# Patient Record
Sex: MALE | Race: WHITE | NOT HISPANIC OR LATINO | URBAN - METROPOLITAN AREA
[De-identification: names, ages, dates, MRNs, and addresses within clinical notes are randomized per-mention and may not be internally consistent; named-entity substitution may affect disease eponyms.]

---

## 2021-10-07 ENCOUNTER — EMERGENCY (EMERGENCY)
Facility: HOSPITAL | Age: 31
LOS: 1 days | Discharge: HOME | End: 2021-10-07
Admitting: EMERGENCY MEDICINE
Payer: COMMERCIAL

## 2021-10-07 VITALS
HEIGHT: 69 IN | OXYGEN SATURATION: 98 % | RESPIRATION RATE: 16 BRPM | HEART RATE: 92 BPM | DIASTOLIC BLOOD PRESSURE: 94 MMHG | WEIGHT: 160.06 LBS | SYSTOLIC BLOOD PRESSURE: 147 MMHG | TEMPERATURE: 98 F

## 2021-10-07 DIAGNOSIS — K21.9 GASTRO-ESOPHAGEAL REFLUX DISEASE WITHOUT ESOPHAGITIS: ICD-10-CM

## 2021-10-07 DIAGNOSIS — R07.0 PAIN IN THROAT: ICD-10-CM

## 2021-10-07 PROCEDURE — 99283 EMERGENCY DEPT VISIT LOW MDM: CPT

## 2021-10-07 NOTE — ED PROVIDER NOTE - NSFOLLOWUPINSTRUCTIONS_ED_ALL_ED_FT
1. Follow up with your GI Specialist next week if your symptoms continue  2. Return to the ED if you develop worsening symptoms such as fevers, cough, shortness of breath or vomiting.

## 2021-10-07 NOTE — ED PROVIDER NOTE - PATIENT PORTAL LINK FT
You can access the FollowMyHealth Patient Portal offered by [facility] by registering at the following website: [email or web address]. By joining [facility]’s FollowMyHealth portal, you will also be able to view your health information using other applications (apps) compatible with our system.

## 2021-10-07 NOTE — ED PROVIDER NOTE - CLINICAL SUMMARY MEDICAL DECISION MAKING FREE TEXT BOX
burning in throat similar to acid reflux in past, no acute findings in ED. (-) signs of allergic or anaphylaxis. Recommend to add Maalox with PPIs & fu GI.

## 2021-10-07 NOTE — ED PROVIDER NOTE - CONSTITUTIONAL, MLM
Well appearing, awake, alert, oriented to person, place, time/situation and in no apparent distress. Patient is speaking full sentences without difficulty. normal...

## 2021-10-07 NOTE — ED ADULT TRIAGE NOTE - CHIEF COMPLAINT QUOTE
c/o worsening acid reflux and throat pain x 3 months, went to urgent care today with c/o difficulty breathing. Pt states "I think I'm just having a panic attack due to the acid reflux" pt states that breathing difficulties have resolved

## 2021-10-07 NOTE — ED PROVIDER NOTE - OBJECTIVE STATEMENT
32 yo M h/o Acid Reflux complains of worsening burning in his throat x 1 day. Pt states he has similar symptoms in June, seen by GI specialist and was diagnosed with GERD. Currently taking PPIs every morning. Currently, patient's symptoms have subsided in the ED.    Pt denies shortness of breath, fevers, chills, body aches, exudates, cough, vomiting, diarrhea of abdominal pain. Pt also denies change in diet or medication non-compliance.

## 2021-10-07 NOTE — ED PROVIDER NOTE - ENMT, MLM
Airway patent, Nasal mucosa clear. Mouth with normal mucosa. (+) mild erythema to posterior pharynx. Throat has no vesicles, no oropharyngeal exudates and uvula is midline.

## 2022-01-22 PROBLEM — K21.9 GASTRO-ESOPHAGEAL REFLUX DISEASE WITHOUT ESOPHAGITIS: Chronic | Status: ACTIVE | Noted: 2021-10-07

## 2022-05-03 ENCOUNTER — EMERGENCY (EMERGENCY)
Facility: HOSPITAL | Age: 32
LOS: 1 days | Discharge: HOME | End: 2022-05-03
Attending: EMERGENCY MEDICINE | Admitting: EMERGENCY MEDICINE
Payer: COMMERCIAL

## 2022-05-03 VITALS
HEART RATE: 98 BPM | DIASTOLIC BLOOD PRESSURE: 86 MMHG | RESPIRATION RATE: 19 BRPM | WEIGHT: 160.06 LBS | SYSTOLIC BLOOD PRESSURE: 126 MMHG | TEMPERATURE: 98 F | OXYGEN SATURATION: 96 % | HEIGHT: 69 IN

## 2022-05-03 DIAGNOSIS — R68.84 JAW PAIN: ICD-10-CM

## 2022-05-03 DIAGNOSIS — M26.602 LEFT TEMPOROMANDIBULAR JOINT DISORDER, UNSPECIFIED: ICD-10-CM

## 2022-05-03 DIAGNOSIS — Z88.1 ALLERGY STATUS TO OTHER ANTIBIOTIC AGENTS STATUS: ICD-10-CM

## 2022-05-03 PROCEDURE — 99282 EMERGENCY DEPT VISIT SF MDM: CPT

## 2022-05-03 RX ORDER — ACETAMINOPHEN 500 MG
650 TABLET ORAL ONCE
Refills: 0 | Status: COMPLETED | OUTPATIENT
Start: 2022-05-03 | End: 2022-05-03

## 2022-05-03 RX ADMIN — Medication 650 MILLIGRAM(S): at 22:46

## 2022-05-03 NOTE — ED PROVIDER NOTE - OBJECTIVE STATEMENT
30 yo M, denies pmhx, complaining of months of L TMJ pain. States last night he felt it "pop" and felt like his jaw locked for a few seconds. States he had a lot of pain at the time at the site, on the L side which has since improved. States he took 600mg of ibuprofen which helped. Denies numbness, tingling, weakness, dental pain. Unclear if patient grinds teeth.

## 2022-05-03 NOTE — ED ADULT TRIAGE NOTE - CHIEF COMPLAINT QUOTE
Pt presents to ed reporting left sided jaw pain approx. 30 min pta. no fever/chills/no abscesses. was lying down when pain began. no recent dental procedures

## 2022-05-03 NOTE — ED PROVIDER NOTE - CLINICAL SUMMARY MEDICAL DECISION MAKING FREE TEXT BOX
Well appearing male, with signs and symptoms of TMJ. Advised adding tylenol to regimen, getting a mouth guard to sleep with for night time and contacting his dentist to see if getting a well fitting mouth guard would help. D/C.

## 2022-05-03 NOTE — ED ADULT NURSE NOTE - OBJECTIVE STATEMENT
Received pt. from triage nurse with complaints of left sided jaw pain .Pt is alert and oriented x3 with no acute distress. Pt seen and evaluated by physician. Pt is medicated as per MD ordered. Pt tolerated well. Safety precautions are maintainer Will continue to monitor.

## 2022-05-03 NOTE — ED PROVIDER NOTE - NSFOLLOWUPINSTRUCTIONS_ED_ALL_ED_FT
Temporomandibular Joint Syndrome       Temporomandibular joint syndrome (TMJ syndrome) is a condition that causes pain in the temporomandibular joints. These joints are located near your ears and allow your jaw to open and close. For people with TMJ syndrome, chewing, biting, or other movements of the jaw can be difficult or painful.    TMJ syndrome is often mild and goes away within a few weeks. However, sometimes the condition becomes a long-term (chronic) problem.      What are the causes?    This condition may be caused by:  •Grinding your teeth or clenching your jaw. Some people do this when they are under stress.      •Arthritis.      •Injury to the jaw.      •Head or neck injury.      •Teeth or dentures that are not aligned well.      In some cases, the cause of TMJ syndrome may not be known.      What are the signs or symptoms?    The most common symptom of this condition is an aching pain on the side of the head in the area of the TMJ. Other symptoms may include:  •Pain when moving your jaw, such as when chewing or biting.      •Being unable to open your jaw all the way.      •Making a clicking sound when you open your mouth.      •Headache.      •Earache.      •Neck or shoulder pain.        How is this diagnosed?    This condition may be diagnosed based on:  •Your symptoms and medical history.      •A physical exam. Your health care provider may check the range of motion of your jaw.      •Imaging tests, such as X-rays or an MRI.      You may also need to see your dentist, who will determine if your teeth and jaw are lined up correctly.      How is this treated?    TMJ syndrome often goes away on its own. If treatment is needed, the options may include:  •Eating soft foods and applying ice or heat.      •Medicines to relieve pain or inflammation.      •Medicines or massage to relax the muscles.      •A splint, bite plate, or mouthpiece to prevent teeth grinding or jaw clenching.      •Relaxation techniques or counseling to help reduce stress.      •A therapy for pain in which an electrical current is applied to the nerves through the skin (transcutaneous electrical nerve stimulation).      •Acupuncture. This is sometimes helpful to relieve pain.      •Jaw surgery. This is rarely needed.        Follow these instructions at home:     Eating and drinking     •Eat a soft diet if you are having trouble chewing.      •Avoid foods that require a lot of chewing. Do not chew gum.      General instructions     •Take over-the-counter and prescription medicines only as told by your health care provider.    •If directed, put ice on the painful area.  •Put ice in a plastic bag.      •Place a towel between your skin and the bag.      •Leave the ice on for 20 minutes, 2–3 times a day.        •Apply a warm, wet cloth (warm compress) to the painful area as directed.      •Massage your jaw area and do any jaw stretching exercises as told by your health care provider.      •If you were given a splint, bite plate, or mouthpiece, wear it as told by your health care provider.      •Keep all follow-up visits as told by your health care provider. This is important.        Contact a health care provider if:    •You are having trouble eating.      •You have new or worsening symptoms.        Get help right away if:    •Your jaw locks open or closed.        Summary    •Temporomandibular joint syndrome (TMJ syndrome) is a condition that causes pain in the temporomandibular joints. These joints are located near your ears and allow your jaw to open and close.      •TMJ syndrome is often mild and goes away within a few weeks. However, sometimes the condition becomes a long-term (chronic) problem.      •Symptoms include an aching pain on the side of the head in the area of the TMJ, pain when chewing or biting, and being unable to open your jaw all the way. You may also make a clicking sound when you open your mouth.      •TMJ syndrome often goes away on its own. If treatment is needed, it may include medicines to relieve pain, reduce inflammation, or relax the muscles. A splint, bite plate, or mouthpiece may also be used to prevent teeth grinding or jaw clenching.      This information is not intended to replace advice given to you by your health care provider. Make sure you discuss any questions you have with your health care provider.      Document Revised: 03/01/2019 Document Reviewed: 01/29/2019    [employer]vier Patient Education © 2022 [employer]
